# Patient Record
Sex: MALE | Race: WHITE | NOT HISPANIC OR LATINO | Employment: OTHER | URBAN - NONMETROPOLITAN AREA
[De-identification: names, ages, dates, MRNs, and addresses within clinical notes are randomized per-mention and may not be internally consistent; named-entity substitution may affect disease eponyms.]

---

## 2024-09-12 ENCOUNTER — OUTSIDE FACILITY SERVICE (OUTPATIENT)
Dept: CARDIOLOGY | Facility: CLINIC | Age: 69
End: 2024-09-12
Payer: MEDICARE

## 2024-11-27 ENCOUNTER — OFFICE VISIT (OUTPATIENT)
Dept: RADIATION ONCOLOGY | Facility: HOSPITAL | Age: 69
End: 2024-11-27
Payer: MEDICARE

## 2024-11-27 ENCOUNTER — HOSPITAL ENCOUNTER (OUTPATIENT)
Dept: RADIATION ONCOLOGY | Facility: HOSPITAL | Age: 69
Setting detail: RADIATION/ONCOLOGY SERIES
Discharge: HOME | End: 2024-11-27
Payer: MEDICARE

## 2024-11-27 VITALS
WEIGHT: 122.3 LBS | HEART RATE: 104 BPM | BODY MASS INDEX: 17.12 KG/M2 | DIASTOLIC BLOOD PRESSURE: 61 MMHG | OXYGEN SATURATION: 98 % | TEMPERATURE: 98.4 F | SYSTOLIC BLOOD PRESSURE: 117 MMHG | HEIGHT: 71 IN | RESPIRATION RATE: 16 BRPM

## 2024-11-27 DIAGNOSIS — C61 PROSTATE CANCER: Primary | ICD-10-CM

## 2024-11-27 PROCEDURE — G0463 HOSPITAL OUTPT CLINIC VISIT: HCPCS

## 2024-11-27 RX ORDER — ONDANSETRON 4 MG/1
4 TABLET, ORALLY DISINTEGRATING ORAL EVERY 8 HOURS PRN
COMMUNITY
Start: 2024-08-19

## 2024-11-27 RX ORDER — LOSARTAN POTASSIUM 25 MG/1
25 TABLET ORAL DAILY
COMMUNITY
Start: 2024-09-13

## 2024-11-27 RX ORDER — TAMSULOSIN HYDROCHLORIDE 0.4 MG/1
1 CAPSULE ORAL DAILY
COMMUNITY
Start: 2024-10-04

## 2024-11-27 RX ORDER — SUCRALFATE 1 G/1
1 TABLET ORAL 4 TIMES DAILY
COMMUNITY
Start: 2024-08-19

## 2024-11-27 RX ORDER — FINASTERIDE 5 MG/1
5 TABLET, FILM COATED ORAL DAILY
COMMUNITY
Start: 2024-09-04

## 2024-11-27 RX ORDER — METOPROLOL SUCCINATE 25 MG/1
25 TABLET, EXTENDED RELEASE ORAL DAILY
COMMUNITY
Start: 2024-08-19

## 2024-11-27 NOTE — PROGRESS NOTES
CONSULTATION NOTE      :                                                          1955  DATE OF CONSULTATION:                       2024   REQUESTING PHYSICIAN:                   Abisai Rhoades MD  REASON FOR CONSULTATION:           Prostate cancer  - Stage IIB (cT1c, cN0, cM0, PSA: 10.7, Grade Group: 2)         BRIEF HISTORY:  The patient is a very pleasant 69 y.o. male  with recent diagnosis of prostate cancer.  He presented with elevated PSA value of 10.7 ng/mL in 2024.  Previous PSAs been 3 ng/mL in 2017 and 3.7 ng/mL in 2018.  He was having no significant lower urinary tract symptoms.  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  Biopsy 2024 showed presence of prostatic adenocarcinoma 3 out of 6 cores from the left lobe.  Jarvis's 3+3 = 6 was present left lateral apex involving 25% tissue and at the left lateral mid gland involving 40% tissue.  Jarvis's 3+4=7 was present at the left mid gland involving 75% tissue.  The right lobe was benign.  Decipher score was 0.83 indicating higher risk disease.    After the biopsy he experienced urinary retention and required 2 to 3-day indwelling bladder catheter.  He is now voiding well again on his own.  He is on finasteride.    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  He should certainly have predicted longevity greater than 10 years.  He is interested in definitive treatment for his prostate cancer.  He is interested in nonsurgical treatment approach and has special interest in CyberKnife stereotactic body radiotherapy.    Allergy: No Known Allergies    Social History:   Social History     Socioeconomic History    Marital status:    Tobacco Use    Smoking status: Every Day     Current packs/day: 1.00     Average packs/day: 1 pack/day for 53.9 years (53.9 ttl pk-yrs)      "Types: Cigarettes     Start date: 1971    Smokeless tobacco: Never   Vaping Use    Vaping status: Never Used   Substance and Sexual Activity    Alcohol use: Never    Drug use: Never    Sexual activity: Defer       Past Medical History:   Past Medical History:   Diagnosis Date    GERD (gastroesophageal reflux disease)     Heart murmur     Prostate cancer 08/2024    Stroke 08/2024    Ulcer, stomach peptic     Stomach & Duodenal Ulcers w/ bleeding 20 years ago       Family History: family history includes Diabetes in his mother; Heart disease in his father.     Surgical History:   Past Surgical History:   Procedure Laterality Date    CATARACT EXTRACTION WITH INTRAOCULAR LENS IMPLANT Bilateral     COLONOSCOPY      10 - 15 years ago    PROSTATE BIOPSY  10/2024    STOMACH SURGERY  2004    Removal of lower 1/2 of stomach        Review of Systems:   Review of Systems   Constitutional:  Positive for fatigue.   HENT:   Positive for hearing loss and tinnitus.         Pt reports tinnitus and hearing loss since being in the .   Gastrointestinal:  Positive for diarrhea.        Pt reports mild GERD that he takes Carafate to help manage.   Genitourinary:  Positive for frequency.         Patient had urinary retention after biopsies and had Pablo cath placed for 1 week; no issues since the end of 10/2024.   Neurological:  Positive for extremity weakness.        Pt reports mild residual left arm weakness since CVA in 8/2024.           Objective   VITAL SIGNS:   Vitals:    11/27/24 0929   BP: 117/61   Pulse: 104   Resp: 16   Temp: 98.4 °F (36.9 °C)   TempSrc: Temporal   SpO2: 98%   Weight: 55.5 kg (122 lb 4.8 oz)   Height: 180.3 cm (71\")   PainSc: 0-No pain        Karnofsky score: 90           IPSS Questionnaire (AUA-7):  Over the past month…    1)  Incomplete Emptying:       How often have you had a sensation of not emptying you had the sensation of not emptying your bladder completely after you finished urinating?  0 - Not at " all   2)  Frequency:       How often have you had the urinate again less than two hours after you finished urinating?  0 - Not at all   3)  Intermittency:       How often have you found you stopped and started again several times when you urinated?   0 - Not at all   4) Urgency:      How often have you found it difficult to postpone urination?  2 - Less than half the time   5) Weak Stream:      How often have you had a weak urinary stream?  2 - Less than half the time   6) Straining:       How often have you had to push or strain to begin urination?  0 - Not at all   7) Nocturia:      How many times did you most typically get up to urinate from the time you went to bed at night until the time you got up in the morning?  1 - 1 time   Total Score:  5   The International Prostate Symptom Score (IPSS) is used to screen, diagnose, track symptoms of benign prostatic hyperplasia (BPH).   0-7 (Mild Symptoms) 8-19 (Moderate) 20-35 (Severe)   Quality of Life (QoL):  If you were to spend the rest of your life with your urinary condition just the way it is now, how would you feel about that? 1-Pleased   Urine Leakage (Incontinence) 0-No Leakage          Sexual Health Inventory for Men (VIKI)   Over the past 6 months:     1. How do you rate your confidence that you could get and keep an erection?  3 - Moderate   2. When you had erections with sexual     stimulation, how often were your erections hard enough for penetration (entering your partner)?  4 - Most times ( much more than, half the time)   3. During sexual intercourse, how often were you able to maintain your erection after you had penetrated (entered) your partner?  3 - Sometimes (About half the time)    4. During sexual intercourse, how difficult was it to maintain your erection to completion of intercourse?  3 - Difficult    5. When you attempted sexual intercourse, how often was it satisfactory for you?  5 - Almost always or always     Total Score: 18   The Sexual  Health Inventory for Men further classifies ED severity with the following breakpoints:   1-7 (Severe ED) 8-11 (Moderate ED) 12-16 (Mild to Moderate ED) 17-21 (Mild ED)         Bowel Health Inventory:     Grade 0 -- No problems, no rectal bleeding, no discharge, less than 5 bowel movements a day      Physical Exam:   Physical Exam  Vitals and nursing note reviewed.   Constitutional:       Appearance: He is well-developed.   HENT:      Head: Normocephalic and atraumatic.   Cardiovascular:      Rate and Rhythm: Normal rate and regular rhythm.      Heart sounds: Normal heart sounds. No murmur heard.  Pulmonary:      Effort: Pulmonary effort is normal.      Breath sounds: Normal breath sounds. No wheezing or rales.   Abdominal:      General: Bowel sounds are normal. There is no distension.      Palpations: Abdomen is soft.      Tenderness: There is no abdominal tenderness.   Genitourinary:     Prostate: Enlarged (approx 30 cc, smooth-surface, no nodules or induration.  Seminal vesicles are nonpalpable.). Not tender and no nodules present.      Rectum: No mass, tenderness, anal fissure, external hemorrhoid or internal hemorrhoid. Normal anal tone.   Musculoskeletal:         General: No tenderness. Normal range of motion.      Cervical back: Normal range of motion and neck supple.   Lymphadenopathy:      Cervical: No cervical adenopathy.      Upper Body:      Right upper body: No supraclavicular adenopathy.      Left upper body: No supraclavicular adenopathy.   Skin:     General: Skin is warm and dry.   Neurological:      Mental Status: He is alert and oriented to person, place, and time.      Sensory: No sensory deficit.   Psychiatric:         Behavior: Behavior normal.         Thought Content: Thought content normal.         Judgment: Judgment normal.              The following portions of the patient's history were reviewed and updated as appropriate: allergies, current medications, past family history, past medical  history, past social history, past surgical history, and problem list.    Assessment:   Assessment      Prostate cancer, Jarvis's 3+4=7, clinical stage IIB (T1c, M0, M0), PSA 10.7 mL.  Decipher genomic score indicated higher risk disease.  He has recovered from postbiopsy urinary retention and is on finasteride.  He continues to recover from mild pontine CVA which occurred in August 2024.  We reviewed the radiotherapy treatment options of SBRT, IMRT or brachytherapy.  He would like to proceed with stereotactic body radiotherapy.  The CyberKnife treatment procedure has been reviewed in detail and all questions were answered.  Informed consent was obtained.    RECOMMENDATIONS: He will return to Dr. Rhoades for placement of fiducials.  He is due for screening colonoscopy and will have that performed prior to treatment of his prostate cancer.  He will subsequently return here for treatment planning CT and MRI pelvis.  The prostate gland and proximal seminal vesicles will receive 5 fractions of 7 Gray, delivered on CyberKnife, on an every other day treatment schedule.    Smoking cessation was discussed and strongly encouraged.  He plans to make a concerted effort to quit smoking prior to treatment of his prostate cancer.      I spent a total of 55 minutes on todays visit, with more than 45 minutes in direct face to face communication, and the remainder of the time spent in reviewing the relevant history, records, available imaging, and for documentation.    Follow Up:   Return in about 4 weeks (around 12/25/2024) for Office Visit, Simulation.  Diagnoses and all orders for this visit:    1. Prostate cancer (Primary)    Other orders  -     SCANNED PATHOLOGY  -     IMAGING SCANNED  -     IMAGING SCANNED  -     IMAGING SCANNED  -     IMAGING SCANNED         Adin Yin MD

## 2024-12-02 DIAGNOSIS — C61 PROSTATE CANCER: Primary | ICD-10-CM

## 2024-12-03 DIAGNOSIS — C61 PROSTATE CANCER: Primary | ICD-10-CM

## 2024-12-04 ENCOUNTER — TELEPHONE (OUTPATIENT)
Dept: RADIATION ONCOLOGY | Facility: HOSPITAL | Age: 69
End: 2024-12-04
Payer: MEDICARE

## 2024-12-04 NOTE — TELEPHONE ENCOUNTER
Pt's wife called stating pt is not getting markers tomorrow.  It is only a consult for markers tomorrow. She states the pt got confused about the appointment.  She states he is getting his colonoscopy on 12/17/2024. Wife went on to ask when he should get the markers and when he would get treatment. She asked several times over several more minutes if he should get them now or wait until the first of the year. Wife states the longer pt has to wait the more depressed he will be. I explained I have no control over another office's schedule and I cannot schedule anything until I know the date of the marker placement. I explained to pt's wife that it did not do any good to discuss this further until I have the marker placement date. I instructed her to call me back tomorrow after they know the date of marker placement at that point I will schedule his other appointments.  Wife verbalized understanding.

## 2024-12-31 DIAGNOSIS — C61 PROSTATE CANCER: Primary | ICD-10-CM

## 2025-01-02 ENCOUNTER — TELEPHONE (OUTPATIENT)
Age: 70
End: 2025-01-02
Payer: MEDICARE

## 2025-01-02 ENCOUNTER — DOCUMENTATION (OUTPATIENT)
Dept: OTHER | Facility: HOSPITAL | Age: 70
End: 2025-01-02
Payer: MEDICARE

## 2025-01-02 ENCOUNTER — HOSPITAL ENCOUNTER (OUTPATIENT)
Facility: HOSPITAL | Age: 70
Setting detail: RADIATION/ONCOLOGY SERIES
End: 2025-01-02
Payer: MEDICARE

## 2025-01-02 ENCOUNTER — CLINICAL SUPPORT (OUTPATIENT)
Age: 70
End: 2025-01-02
Payer: MEDICARE

## 2025-01-02 DIAGNOSIS — C61 PROSTATE CANCER: Primary | ICD-10-CM

## 2025-01-02 NOTE — PROGRESS NOTES
JOHANNA contacted pt to discuss lodging for radiation treatment. Pt reported he will be getting treatment every other day, therefore they have decided to drive. SW provided education on transportation assistance if needed. Pt denied at this time, but thanked JOHANNA for the information. SW provided contact information should other needs arise, and will be available for ongoing support and assistance.

## 2025-01-02 NOTE — PROGRESS NOTES
Telehealth telephone follow-up visit with patient.    He has a history of clinical localized, higher risk prostate cancer.  He also has a history of pontine CVA from which she is recovering.  He elected to undergo definitive treatment of the prostate cancer with CyberKnife stereotactic body radiotherapy.  He recently underwent placement of fiducials and rectal spacer performed by Dr. Rhoades without difficulty.  He has no residual hematuria or change in voiding.  No pelvic pain.  No fevers or chills.  Since he was seen in consultation he also underwent screening colonoscopy with removal of 1 reportedly benign polyp.  Will verify that.  Bowel function is back to normal.  He is making a strong effort to discontinue cigarette smoking.  He has decreased from 2 pack/day habit down to 1/2 pack/day and plans to fully quit smoking prior to initiation of radiotherapy.    Impression: Prostate cancer, [place]'s 3+4=7, clinical stage IIB (T1c, M0, M0), PSA 10.7 mL.  Decipher genomic score indicated higher risk disease.    Plan:  Will get a copy of the colonoscopy report.  Dietitian consultation regarding reduce gas-forming foods during treatment.  CT simulation and treatment planning MRI on  1/9/2025  The prostate gland and seminal vesicles will receive 5 fractions of 7 Gray, delivered on the CyberKnife, on an every other day treatment schedule.    Approximate 15 minutes was spent reviewing records, discussing with patient per documentation.

## 2025-01-02 NOTE — TELEPHONE ENCOUNTER
Contacted patient to confirm thata he had colonoscopy completed on 12/17. LVM with this inquiry. Dr. Yin advised he has already spoken to the patient this morning and did have completed in [place]. Registrar, Tiffanie, will reach out to [facility] in [place] to obtain report.     Patient reported to Dr. Yin that he has not spoken with dietitian for next steps for SIM/MRI/treatments. Message sent to  [facility] and [facility] dietitians.

## 2025-01-03 ENCOUNTER — DOCUMENTATION (OUTPATIENT)
Dept: NUTRITION | Facility: HOSPITAL | Age: 70
End: 2025-01-03
Payer: MEDICARE

## 2025-01-07 ENCOUNTER — HOSPITAL ENCOUNTER (OUTPATIENT)
Dept: RADIATION ONCOLOGY | Facility: HOSPITAL | Age: 70
Setting detail: RADIATION/ONCOLOGY SERIES
Discharge: HOME | End: 2025-01-07
Payer: MEDICARE

## 2025-01-07 ENCOUNTER — HOSPITAL ENCOUNTER (OUTPATIENT)
Dept: MRI IMAGING | Facility: HOSPITAL | Age: 70
Discharge: HOME | End: 2025-01-07
Admitting: RADIOLOGY
Payer: MEDICARE

## 2025-01-07 ENCOUNTER — HOSPITAL ENCOUNTER (OUTPATIENT)
Dept: RADIATION ONCOLOGY | Facility: HOSPITAL | Age: 70
Discharge: HOME | End: 2025-01-07

## 2025-01-07 DIAGNOSIS — C61 PROSTATE CANCER: ICD-10-CM

## 2025-01-07 PROCEDURE — 77399 UNLISTED PX MED RADJ PHYSICS: CPT | Performed by: RADIOLOGY

## 2025-01-07 PROCEDURE — 72195 MRI PELVIS W/O DYE: CPT

## 2025-01-13 PROCEDURE — 77301 RADIOTHERAPY DOSE PLAN IMRT: CPT | Performed by: RADIOLOGY

## 2025-01-13 PROCEDURE — 77338 DESIGN MLC DEVICE FOR IMRT: CPT | Performed by: RADIOLOGY

## 2025-01-13 PROCEDURE — 77300 RADIATION THERAPY DOSE PLAN: CPT | Performed by: RADIOLOGY

## 2025-01-22 ENCOUNTER — DOCUMENTATION (OUTPATIENT)
Dept: OTHER | Facility: HOSPITAL | Age: 70
End: 2025-01-22
Payer: MEDICARE

## 2025-01-22 NOTE — PROGRESS NOTES
SW contacted pt on this date to assist with gas vouchers. SW provided introductions and an overview of the gas voucher system, and pt voiced understanding. SW made plan with pt to meet with him prior to his cyberknife appointment on 1/23/25 or at his next treatment, pending repairs on the cyberknife machine. Pt expressed gratitude and appreciation for all of the care he has received at [facility]. SW thanked pt for his kind words and thanked him for his understanding and patience. Pt denied any other needs at this time but agreed to reach out as needs arise. Pt thanked JOHANNA for the call and SW provided contact information. SW will continue to be available for ongoing support and assistance.

## 2025-01-23 ENCOUNTER — HOSPITAL ENCOUNTER (OUTPATIENT)
Dept: RADIATION ONCOLOGY | Facility: HOSPITAL | Age: 70
Discharge: HOME | End: 2025-01-23

## 2025-01-23 PROCEDURE — 77373 STRTCTC BDY RAD THER TX DLVR: CPT | Performed by: RADIOLOGY

## 2025-01-23 NOTE — PROGRESS NOTES
SW met with pt prior to his cyberknife treatment on this date to provide pt with previously discussed gas voucher. SW provided pt with a $25.00 gas voucher for [employer] to be redeemed on 1/27/25. Pt thanked JOHANNA for he assistance, denied any additional needs, and agreed to reach out should needs arise in the future. SW will continue to be available for ongoing support and assistance.      Interventions:  Transportation Needs: gas cards (Provided pt with $25.00 gas voucher for [employer] to be redeemed on 1/27/25.)

## 2025-01-27 ENCOUNTER — HOSPITAL ENCOUNTER (OUTPATIENT)
Dept: RADIATION ONCOLOGY | Facility: HOSPITAL | Age: 70
Discharge: HOME | End: 2025-01-27
Payer: MEDICARE

## 2025-01-27 PROCEDURE — 77373 STRTCTC BDY RAD THER TX DLVR: CPT | Performed by: RADIOLOGY

## 2025-01-29 ENCOUNTER — HOSPITAL ENCOUNTER (OUTPATIENT)
Dept: RADIATION ONCOLOGY | Facility: HOSPITAL | Age: 70
Discharge: HOME | End: 2025-01-29

## 2025-01-29 PROCEDURE — 77373 STRTCTC BDY RAD THER TX DLVR: CPT | Performed by: RADIOLOGY

## 2025-01-30 ENCOUNTER — HOSPITAL ENCOUNTER (OUTPATIENT)
Dept: RADIATION ONCOLOGY | Facility: HOSPITAL | Age: 70
Discharge: HOME | End: 2025-01-30

## 2025-01-30 PROCEDURE — 77373 STRTCTC BDY RAD THER TX DLVR: CPT | Performed by: RADIOLOGY

## 2025-01-31 ENCOUNTER — HOSPITAL ENCOUNTER (OUTPATIENT)
Dept: RADIATION ONCOLOGY | Facility: HOSPITAL | Age: 70
Discharge: HOME | End: 2025-01-31

## 2025-01-31 PROCEDURE — 77336 RADIATION PHYSICS CONSULT: CPT | Performed by: RADIOLOGY

## 2025-01-31 PROCEDURE — 77373 STRTCTC BDY RAD THER TX DLVR: CPT | Performed by: RADIOLOGY

## 2025-03-04 ENCOUNTER — HOSPITAL ENCOUNTER (OUTPATIENT)
Dept: RADIATION ONCOLOGY | Facility: HOSPITAL | Age: 70
Setting detail: RADIATION/ONCOLOGY SERIES
Discharge: HOME | End: 2025-03-04
Payer: MEDICARE

## 2025-03-07 ENCOUNTER — CLINICAL SUPPORT (OUTPATIENT)
Dept: RADIATION ONCOLOGY | Facility: HOSPITAL | Age: 70
End: 2025-03-07
Payer: MEDICARE

## 2025-03-07 DIAGNOSIS — C61 PROSTATE CANCER: Primary | ICD-10-CM

## 2025-03-07 NOTE — PROGRESS NOTES
FOLLOW UP NOTE    PATIENT:                                                      Evelio Dos Santos  MEDICAL RECORD #:                        [id number]  :                                                          1955  COMPLETION DATE:   2025  DIAGNOSIS:     Prostate cancer  - Stage IIB (cT1c, cN0, cM0, PSA: 10.7, Grade Group: 2)    This visit has been converted to a telehealth virtual visit, the patient's preferred method for today's follow-up.  Total time of discussion was 24 minutes.  The patient has given verbal consent.      BRIEF HISTORY:    Initial follow-up visit.  He has clinically localized prostate cancer with decipher genomic score indicating higher risk disease.  He underwent definitive treatment with a course of CyberKnife stereotactic body radiotherapy, completing 2025.  He tolerated treatment well.  Initial posttreatment fatigue was reportedly very mild.  He also has a history of pontine CVA from which he is recovering, noting this has also contributed to his fatigue.  He did develop mild exacerbation of urinary irritative and outflow obstructive symptoms, including some brief dysuria and increased urinary urgency, managed with OTC Azo as needed in addition to continuing finasteride and Flomax daily.  At this point, he reports urinary function is stable and at baseline.  He denies gross hematuria.  He denies urinary incontinence.  He denies acute urinary complaints currently.  He reports some intermittent fecal leakage for the first few days following treatment, which has since resolved.  He otherwise denies change in bowel function, hematochezia, or current GI complaints.  No new aches or pains.  Overall, he feels well.        MEDICATIONS: Medication reconciliation for the patient was reviewed and confirmed in the electronic medical record.    Review of Systems   Constitutional:  Positive for fatigue.   HENT:   Positive for hearing loss and tinnitus.         Pt reports tinnitus and  hearing loss since being in the .    Gastrointestinal:         Pt reports mild GERD that he takes Carafate to help manage.    Genitourinary:  Positive for frequency.    Neurological:  Positive for extremity weakness (Pt reports mild residual left arm weakness since CVA in 8/2024.).   All other systems reviewed and are negative.    KPS 90%            Physical Exam  Pulmonary:      Respirations even, unlabored. No audible wheezing or cough.  Neurological:      A+Ox4, conversant, answers questions appropriately.  Psychiatric:     Judgement, affect, and decision-making WNL.    Limited physical exam as visit was conducted remotely via telephone.          The following portions of the patient's history were reviewed and updated as appropriate: allergies, current medications, past family history, past medical history, past social history, past surgical history and problem list.         Diagnoses and all orders for this visit:    1. Prostate cancer (Primary)         IMPRESSION:  Prostate cancer, Jarvis's 3+4=7, clinical stage IIB (T1c, M0, M0), PSA 10.7 mL.  Decipher genomic score indicated higher risk disease.  He has recovered from postbiopsy urinary retention and is on finasteride.  1 month status post CyberKnife SBRT.  He tolerated treatment well.  He is recovering well from expected acute radiation related toxicities.  We discussed plan to taper/discontinue Flomax, as tolerated.  He will continue finasteride unchanged for now, but will discuss further recommendations with Dr. Rhoades.    Mr. Dos Santos and I have reviewed the survivorship care plan in detail.  We discussed diagnosis, follow-up intervals, biannual PSA monitoring, and expectations for response to treatment, including typical timeline for PSA to hopefully reach target josé value of <0.5 ng/mL.  A copy of the care plan has been mailed to the patient.  A copy has also been sent to his PCP.        RECOMMENDATIONS:  Continue routine urologic surveillance  under the care of Dr. Pop Rhoades, with follow-up and repeat PSA scheduled in early May 2025.  RTC in 6 months, or sooner as needed.      Return in about 6 months (around 9/7/2025) for Office Visit.    [place] Teresa      I spent a total of 40 minutes on today's visit, with more than 24 minutes in virtual communication with the patient via telephone, and the remainder of the time spent in reviewing the relevant history, records, available imaging, and for documentation.

## 2025-05-08 ENCOUNTER — TELEPHONE (OUTPATIENT)
Dept: RADIATION ONCOLOGY | Facility: HOSPITAL | Age: 70
End: 2025-05-08
Payer: MEDICARE

## 2025-05-08 NOTE — TELEPHONE ENCOUNTER
Returned patient's request for phonecall re: PSA    Reiterated terrific early biochemical response to treatment with a decline in PSA down to a value of 0.7 ng/mL, 10 full points lower than his pre-treatment level. The patient was satisfied to hear this. He was encouraged to keep his upcoming f/u appt with Dr. Rhoades, with recommendations to have repeat PSA values q6 mo for the first couple of years and q6-12 mo thereafter for ongoing surveillance.